# Patient Record
Sex: MALE | Race: OTHER | URBAN - METROPOLITAN AREA
[De-identification: names, ages, dates, MRNs, and addresses within clinical notes are randomized per-mention and may not be internally consistent; named-entity substitution may affect disease eponyms.]

---

## 2021-01-04 ENCOUNTER — EMERGENCY (EMERGENCY)
Facility: HOSPITAL | Age: 41
LOS: 0 days | Discharge: HOME | End: 2021-01-04
Payer: COMMERCIAL

## 2021-01-04 VITALS
HEIGHT: 68 IN | OXYGEN SATURATION: 98 % | TEMPERATURE: 98 F | DIASTOLIC BLOOD PRESSURE: 85 MMHG | RESPIRATION RATE: 16 BRPM | WEIGHT: 149.91 LBS | SYSTOLIC BLOOD PRESSURE: 118 MMHG | HEART RATE: 75 BPM

## 2021-01-04 DIAGNOSIS — M79.652 PAIN IN LEFT THIGH: ICD-10-CM

## 2021-01-04 DIAGNOSIS — Z91.013 ALLERGY TO SEAFOOD: ICD-10-CM

## 2021-01-04 DIAGNOSIS — M54.32 SCIATICA, LEFT SIDE: ICD-10-CM

## 2021-01-04 PROCEDURE — 99284 EMERGENCY DEPT VISIT MOD MDM: CPT

## 2021-01-04 RX ORDER — KETOROLAC TROMETHAMINE 30 MG/ML
30 SYRINGE (ML) INJECTION ONCE
Refills: 0 | Status: DISCONTINUED | OUTPATIENT
Start: 2021-01-04 | End: 2021-01-04

## 2021-01-04 RX ORDER — CYCLOBENZAPRINE HYDROCHLORIDE 10 MG/1
1 TABLET, FILM COATED ORAL
Qty: 9 | Refills: 0
Start: 2021-01-04 | End: 2021-01-06

## 2021-01-04 RX ADMIN — Medication 30 MILLIGRAM(S): at 20:22

## 2021-01-04 NOTE — ED PROVIDER NOTE - CLINICAL SUMMARY MEDICAL DECISION MAKING FREE TEXT BOX
suspect sciatica, radicular pain down LLE, no red flag signs of symptoms, recommend nsaids, muscle relaxant PRN, outpt f/u

## 2021-01-04 NOTE — ED PROVIDER NOTE - OBJECTIVE STATEMENT
40M here with left thigh pain radiating from back down to LLE. He reports intermittent pain. NO numbness or weakness. Denies fever, chills, nausea, vomiting. NO trauma. Denies bowel or bladder dysfunction.

## 2021-01-04 NOTE — ED ADULT TRIAGE NOTE - CHIEF COMPLAINT QUOTE
pt c/o R-leg pain, from thigh down to ankle x 3 days.  on/off numbness and tingling. starts as a tingling sensation and then gets a sharp pain.    denies injury/trauma.  denies fever/cough/chills/covid-contacts

## 2021-01-04 NOTE — ED ADULT NURSE NOTE - OBJECTIVE STATEMENT
sharp pain 5/10 from left thigh radiating down to left foot, aggravated by ambulation. Pt denies fall/trauma, HA, chest pain, SOB, abd pain, nausea, diarrhea, or dysuria. Pt presents with sharp pain 5/10 beginning in the left thigh radiating down to left foot, aggravated by ambulation. Pt denies fall/trauma, HA, chest pain, SOB, abd pain, nausea, diarrhea, or dysuria.

## 2021-01-04 NOTE — ED PROVIDER NOTE - PATIENT PORTAL LINK FT
You can access the FollowMyHealth Patient Portal offered by Health system by registering at the following website: [email or web address]. By joining [employer]’s FollowMyHealth portal, you will also be able to view your health information using other applications (apps) compatible with our system.

## 2021-10-30 ENCOUNTER — EMERGENCY (EMERGENCY)
Facility: HOSPITAL | Age: 41
LOS: 1 days | Discharge: HOME | End: 2021-10-30
Attending: EMERGENCY MEDICINE | Admitting: EMERGENCY MEDICINE
Payer: OTHER MISCELLANEOUS

## 2021-10-30 VITALS
RESPIRATION RATE: 17 BRPM | OXYGEN SATURATION: 98 % | TEMPERATURE: 100 F | HEART RATE: 116 BPM | DIASTOLIC BLOOD PRESSURE: 72 MMHG | SYSTOLIC BLOOD PRESSURE: 116 MMHG

## 2021-10-30 VITALS
OXYGEN SATURATION: 100 % | TEMPERATURE: 99 F | HEART RATE: 100 BPM | RESPIRATION RATE: 17 BRPM | SYSTOLIC BLOOD PRESSURE: 119 MMHG | DIASTOLIC BLOOD PRESSURE: 69 MMHG

## 2021-10-30 PROCEDURE — 73590 X-RAY EXAM OF LOWER LEG: CPT | Mod: 26,RT

## 2021-10-30 PROCEDURE — 99284 EMERGENCY DEPT VISIT MOD MDM: CPT

## 2021-10-30 PROCEDURE — 93971 EXTREMITY STUDY: CPT | Mod: 26,LT

## 2021-10-30 RX ORDER — KETOROLAC TROMETHAMINE 30 MG/ML
15 SYRINGE (ML) INJECTION ONCE
Refills: 0 | Status: DISCONTINUED | OUTPATIENT
Start: 2021-10-30 | End: 2021-10-30

## 2021-10-30 RX ORDER — ACETAMINOPHEN 500 MG
975 TABLET ORAL ONCE
Refills: 0 | Status: COMPLETED | OUTPATIENT
Start: 2021-10-30 | End: 2021-10-30

## 2021-10-30 RX ADMIN — Medication 15 MILLIGRAM(S): at 11:37

## 2021-10-30 RX ADMIN — Medication 15 MILLIGRAM(S): at 13:27

## 2021-10-30 RX ADMIN — Medication 300 MILLIGRAM(S): at 11:37

## 2021-10-30 RX ADMIN — Medication 975 MILLIGRAM(S): at 13:31

## 2021-10-30 NOTE — ED ADULT NURSE NOTE - OBJECTIVE STATEMENT
Receive pt. in Intake room 5 alert and oriented 4, presenting to the ER with complaints of right lower leg pain. 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 Right calf area with redness and swelling, warm to touch, positive pedal pulses present. right leg mobile. Medicated as ordered, waiting for ultrasound.

## 2021-10-30 NOTE — ED PROVIDER NOTE - PHYSICAL EXAMINATION
General: Alert and Orientated x 3. No apparent distress.  Head: Normocephalic and atraumatic.  Eyes: PERRLA with EOMI.  Neck: Supple. Trachea midline.   Cardiac: Normal S1 and S2 w/ tachycardia No murmurs appreciated. peripheral pulses intact   Pulmonary:CTA  bilaterally. No increased WOB. No wheezes or crackles.  Abdominal: Soft, non-tender. (+) bowel sounds appreciated in all 4 quadrants. No hepatosplenomegaly.   Neurologic: No focal sensory or motor deficits. cn2-12 grossly intact   Musculoskeletal: Strength appropriate in all 4 extremities for age with no limited ROM. R LE medial distal calf w/ area of erythema and warmth measuring ~5cm. No bony deformities. No edema. No obvious abrasions or lacerations. Ambulates w/ pain.   Skin: Color appropriate for race. Intact, warm, and well-perfused.  Psychiatric: Appropriate mood and affect. No apparent risk to self or others.

## 2021-10-30 NOTE — ED ADULT NURSE REASSESSMENT NOTE - NS ED NURSE REASSESS COMMENT FT1
Patient being discharged at present. Able to ambulate. Pain reduced. To f/u with orthopedist outpatient. Will cont. to monitor.

## 2021-10-30 NOTE — ED PROVIDER NOTE - CLINICAL SUMMARY MEDICAL DECISION MAKING FREE TEXT BOX
40 y/o M p/w R calf pain x 1 day, radiates up R leg to R chest. No SOB. Difficulty ambulating.  No fever but chills this AM. Vitals tachycardic. On exam, area of erythema and warmth on R medial calf. Differential includes fracture of extremity, DVT and infection like cellulitis. Will get xray to eval for fracture, US for DVT and will start clindamycin for abx therapy. Dispo- pending imaging.

## 2021-10-30 NOTE — ED PROVIDER NOTE - PATIENT PORTAL LINK FT
You can access the FollowMyHealth Patient Portal offered by [facility] by registering at the following website: [email or web address]. By joining [employer]’s FollowMyHealth portal, you will also be able to view your health information using other applications (apps) compatible with our system.

## 2021-10-30 NOTE — ED PROVIDER NOTE - ATTENDING CONTRIBUTION TO CARE
41 year old male with right leg injury at work yesterday, dropped a 6 foot piece of metal on it.  The area turned red and is painful. He felt chills this morning. No abd pain, no chest pain, no sob. US, x-ray noted. Clinically looks like cellulitis with good reflexes and no crepitus. Precautions reviewed.

## 2021-10-30 NOTE — ED PROVIDER NOTE - OBJECTIVE STATEMENT
Patient is 42 y/o M with no PMHx and PSHx appendectomy who presents to ED with R leg pain., States he was Patient is 42 y/o M with no PMHx and PSHx appendectomy who presents to ED with R leg pain., States he was lifting metal sheet at work yesterday when it fell out of his hands and struck his right leg. Since midnight having progressively worsening RLE pain starting at R medial calf radiating up leg to R chest. Pain is severe, 9/10. UNbale to ambulate due to pain. No SOB, but does endorse R chest pain. No hx of dvts. No fevers, but chills this AM. No relieve with aleve. No recent travel or URI symptoms.

## 2021-10-30 NOTE — ED PROVIDER NOTE - NS ED ROS FT
CONSTITUTIONAL: No fevers, no chills, no lightheadedness, no dizziness  EYES: no visual changes, no eye pain  NOSE: no nasal congestion  MOUTH/THROAT: no sore throat  CV: +chest pain, no palpitations  RESP: No SOB, no cough  GI: No n/v/d, no abd pain  : no dysuria, no hematuria, no flank pain  MSK: see HPI   SKIN: see HPI   NEURO: no headache, no focal weakness, no decreased sensation/parasthesias   PSYCHIATRIC: no known mental health issues

## 2021-10-30 NOTE — ED ADULT TRIAGE NOTE - CHIEF COMPLAINT QUOTE
c/o right calf pain s/p injury at work yesterday. Pt states woke up with chills and right leg feeling swollen this morning. Pt feel pain/tightness to right chest with deep breaths.

## 2021-10-30 NOTE — ED PROVIDER NOTE - NSFOLLOWUPINSTRUCTIONS_ED_ALL_ED_FT
You were seen in the Emergency Department for right leg pain. Xrays and ultrasound were done and these results were discussed with you. Prescriptions were sent to your pharmacy:     1. Clindamycin 300mg every 6 hours for 10 days  2. Naproxen 500mg twice as day for 7 days as needed    You will receive a call to make an appointment with Orthopedic Surgery. Please follow with your primary care provider.     Please return to the Emergency Department if you experience fever, chills, nausea, vomiting, chest pain, palpitations, skin changes, swelling or any concerning symptoms.

## 2022-05-31 NOTE — ED PROVIDER NOTE - WR ORDER STATUS 1
Resulted Erythromycin Counseling:  I discussed with the patient the risks of erythromycin including but not limited to GI upset, allergic reaction, drug rash, diarrhea, increase in liver enzymes, and yeast infections.

## 2022-10-11 ENCOUNTER — EMERGENCY (EMERGENCY)
Facility: HOSPITAL | Age: 42
LOS: 1 days | Discharge: HOME | End: 2022-10-11
Attending: EMERGENCY MEDICINE | Admitting: EMERGENCY MEDICINE

## 2022-10-11 VITALS
DIASTOLIC BLOOD PRESSURE: 62 MMHG | HEART RATE: 58 BPM | OXYGEN SATURATION: 100 % | RESPIRATION RATE: 16 BRPM | TEMPERATURE: 98 F | SYSTOLIC BLOOD PRESSURE: 126 MMHG

## 2022-10-11 PROCEDURE — 73030 X-RAY EXAM OF SHOULDER: CPT | Mod: 26,LT

## 2022-10-11 PROCEDURE — 72100 X-RAY EXAM L-S SPINE 2/3 VWS: CPT | Mod: 26

## 2022-10-11 PROCEDURE — 99284 EMERGENCY DEPT VISIT MOD MDM: CPT

## 2022-10-11 RX ORDER — CYCLOBENZAPRINE HYDROCHLORIDE 10 MG/1
5 TABLET, FILM COATED ORAL ONCE
Refills: 0 | Status: COMPLETED | OUTPATIENT
Start: 2022-10-11 | End: 2022-10-11

## 2022-10-11 RX ORDER — ACETAMINOPHEN 500 MG
975 TABLET ORAL ONCE
Refills: 0 | Status: COMPLETED | OUTPATIENT
Start: 2022-10-11 | End: 2022-10-11

## 2022-10-11 RX ORDER — LIDOCAINE 4 G/100G
1 CREAM TOPICAL ONCE
Refills: 0 | Status: COMPLETED | OUTPATIENT
Start: 2022-10-11 | End: 2022-10-11

## 2022-10-11 RX ORDER — IBUPROFEN 200 MG
600 TABLET ORAL ONCE
Refills: 0 | Status: COMPLETED | OUTPATIENT
Start: 2022-10-11 | End: 2022-10-11

## 2022-10-11 RX ADMIN — Medication 600 MILLIGRAM(S): at 20:31

## 2022-10-11 RX ADMIN — LIDOCAINE 1 PATCH: 4 CREAM TOPICAL at 20:31

## 2022-10-11 RX ADMIN — Medication 975 MILLIGRAM(S): at 21:10
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
## 2022-10-11 NOTE — ED PROVIDER NOTE - PHYSICAL EXAMINATION
VITALS:   T(C): 36.8 (10-11-22 @ 18:43), Max: 36.8 (10-11-22 @ 18:43)  HR: 58 (10-11-22 @ 18:43) (58 - 58)  BP: 126/62 (10-11-22 @ 18:43) (126/62 - 126/62)  RR: 16 (10-11-22 @ 18:43) (16 - 16)  SpO2: 100% (10-11-22 @ 18:43) (100% - 100%)    GENERAL: NAD, sitting in bed  HEAD:  Atraumatic, Normocephalic  EYES: EOMI, PERRLA, conjunctiva and sclera clear  ENT: Moist mucous membranes  NECK: Supple, No midline tenderness  CHEST/LUNG: Clear to auscultation bilaterally; No rales, rhonchi, wheezing, or rubs. Unlabored respirations  HEART: Regular rate and rhythm; No murmurs, rubs, or gallops  ABDOMEN: BSx4; Soft, nontender, nondistended  EXTREMITIES:  No clubbing, cyanosis, or edema. No swelling around shoulder. Increased shoulder pain with ranging of shoulder. TTP anteriorly, posteriorly, 5/5 strength in shoulders, elbows b/l. 5/5 strength flexing at hips  BACK: TTP paraspinal lumbar region, TTP midline lumbar spine  NERVOUS SYSTEM:  A&Ox3, no focal deficits   SKIN: No rashes or lesions
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
## 2023-05-09 NOTE — ED ADULT NURSE NOTE - NSICDXPASTMEDICALHX_GEN_ALL_CORE_FT
Patient requests all Lab, Cardiology, and Radiology Results on their Discharge Instructions
PAST MEDICAL HISTORY:  No pertinent past medical history

## 2024-03-10 PROBLEM — Z78.9 OTHER SPECIFIED HEALTH STATUS: Chronic | Status: ACTIVE | Noted: 2021-01-04

## 2024-09-09 ENCOUNTER — APPOINTMENT (OUTPATIENT)
Age: 44
End: 2024-09-09